# Patient Record
Sex: FEMALE | Race: WHITE
[De-identification: names, ages, dates, MRNs, and addresses within clinical notes are randomized per-mention and may not be internally consistent; named-entity substitution may affect disease eponyms.]

---

## 2017-11-11 ENCOUNTER — HOSPITAL ENCOUNTER (EMERGENCY)
Dept: HOSPITAL 31 - C.ER | Age: 27
Discharge: HOME | End: 2017-11-11
Payer: SELF-PAY

## 2017-11-11 VITALS
DIASTOLIC BLOOD PRESSURE: 72 MMHG | RESPIRATION RATE: 14 BRPM | SYSTOLIC BLOOD PRESSURE: 111 MMHG | TEMPERATURE: 98.9 F | OXYGEN SATURATION: 97 % | HEART RATE: 89 BPM

## 2017-11-11 VITALS — BODY MASS INDEX: 42.2 KG/M2

## 2017-11-11 DIAGNOSIS — H60.93: Primary | ICD-10-CM

## 2017-11-11 NOTE — C.PDOC
Time Seen by Provider: 11/11/17 19:28


Chief Complaint (Nursing): ENT Problem





Past Medical History


Vital Signs: 





 Last Vital Signs











Temp  98.9 F   11/11/17 19:25


 


Pulse  89   11/11/17 19:25


 


Resp  14   11/11/17 19:25


 


BP  111/72   11/11/17 19:25


 


Pulse Ox  97   11/11/17 19:25














- [facility] Procedures











REPAIR OB LACERATION NEC (11/22/14)








Family History: States: Unknown Family Hx





- Social History


Hx Tobacco Use: No


Hx Alcohol Use: No


Hx Substance Use: No





- Immunization History


Hx Tetanus Toxoid Vaccination: No


Hx Influenza Vaccination: No


Hx Pneumococcal Vaccination: No





ED Course And Treatment


O2 Sat by Pulse Oximetry: 97





Disposition


Counseled Patient/Family Regarding: Diagnosis, Need For Followup, Rx Given





- Disposition


Disposition: HOME/ ROUTINE


Disposition Time: 19:49


Condition: STABLE


Additional Instructions: 


Please follow up with PMD 





Take meds as directed 





Return to ER if worse 


Prescriptions: 


Ibuprofen [Motrin Tab] 800 mg PO QID #20 tab


Neomycin/Polymyxin/Hydrocortis [Cortisporin Otic Susp] 4 drop AU TID #1 bottle


Instructions:  Otitis Externa (ED)


Forms:  [employer] (Czech)


Print Language: Kyrgyz





- Clinical Impression


Clinical Impression: 


 Otitis externa

## 2017-11-11 NOTE — C.PDOC
History Of Present Illness





26 y/o female c/o bilateral ear pain for 3 days. Patient denies fever, chills, 

nausea, vomiting, headache, dizziness, or URI symptoms.





Time Seen by Provider: 11/11/17 19:28


Chief Complaint (Nursing): ENT Problem


History Per: Patient


History/Exam Limitations: None


Onset/Duration Of Symptoms: Days (3)


Current Symptoms Are (Timing): Still Present


Quality (Ear): Pain W/Touch


Severity: Mild





Past Medical History


Reviewed: Historical Data, Nursing Documentation, Vital Signs


Vital Signs: 


 Last Vital Signs











Temp  98.9 F   11/11/17 19:25


 


Pulse  89   11/11/17 19:25


 


Resp  14   11/11/17 19:25


 


BP  111/72   11/11/17 19:25


 


Pulse Ox  97   11/12/17 03:36














- [facility] Procedures








REPAIR OB LACERATION NEC (11/22/14)








Family History: States: Unknown Family Hx





- Social History


Hx Tobacco Use: No


Hx Alcohol Use: No


Hx Substance Use: No





- Immunization History


Hx Tetanus Toxoid Vaccination: No


Hx Influenza Vaccination: No


Hx Pneumococcal Vaccination: No





Review Of Systems


Except As Marked, All Systems Reviewed And Found Negative.


Constitutional: Negative for: Fever


ENT: Positive for: Ear Pain.  Negative for: Nose Discharge, Nose Congestion, 

Throat Pain


Respiratory: Negative for: Cough


Gastrointestinal: Negative for: Nausea, Vomiting





Physical Exam





- Physical Exam


Appears: Non-toxic, In Acute Distress (Crying due to ear pain)


Skin: Warm, Dry


Head: Atraumatic, Normacephalic


Ear(s): Left: Other (Left tragal tenderness and erythematous canal. Purulent 

effusion tothe left ear canal with swelling. Unable to visualize TM due to 

effusion. Right tragal tenderness, Minimal erythema, no effusion.), Right: Other


Throat: Normal, No Erythema


Neck: Normal, No Other (swelling)


Respiratory: Normal Breath Sounds


Neurological/Psych: Oriented x3





ED Course And Treatment


O2 Sat by Pulse Oximetry: 97


Pulse Ox Interpretation: Normal


Progress Note: Plans: Cortisporin, Tylenol with codeine.  Pt has much improved 

after pain meds, cortisporin otic soln applied and directions given to continue 

drops and follw up instructions and return precautions discussed and understood 

by pt





Disposition


Counseled Patient/Family Regarding: Studies Performed, Diagnosis, Need For 

Followup, Rx Given





- Disposition


Disposition: HOME/ ROUTINE


Disposition Time: 19:49


Condition: STABLE


Additional Instructions: 


Please follow up with PMD 





Take meds as directed 





Return to ER if worse 


Prescriptions: 


Ibuprofen [Motrin Tab] 800 mg PO QID #20 tab


Neomycin/Polymyxin/Hydrocortis [Cortisporin Otic Susp] 4 drop AU TID #1 bottle


Instructions:  Otitis Externa (ED)


Forms:  [employer] (Indonesian)


Print Language: Mongolian





- Clinical Impression


Clinical Impression: 


 Otitis externa








- Scribe Statement


The provider has reviewed the documentation as recorded by the Scribjovani leonardo





All medical record entries made by the Giselaibjovani were at my direction and 

personally dictated by me. I have reviewed the chart and agree that the record 

accurately reflects my personal performance of the history, physical exam, 

medical decision making, and the department course for this patient. I have 

also personally directed, reviewed, and agree with the discharge instructions 

and disposition.

## 2018-07-02 ENCOUNTER — HOSPITAL ENCOUNTER (EMERGENCY)
Dept: HOSPITAL 31 - C.ER | Age: 28
Discharge: HOME | End: 2018-07-02
Payer: COMMERCIAL

## 2018-07-02 VITALS
TEMPERATURE: 99 F | HEART RATE: 100 BPM | DIASTOLIC BLOOD PRESSURE: 76 MMHG | RESPIRATION RATE: 20 BRPM | OXYGEN SATURATION: 98 % | SYSTOLIC BLOOD PRESSURE: 122 MMHG

## 2018-07-02 VITALS — BODY MASS INDEX: 42.2 KG/M2

## 2018-07-02 DIAGNOSIS — M54.5: Primary | ICD-10-CM

## 2018-07-02 DIAGNOSIS — H60.90: ICD-10-CM

## 2018-07-02 LAB
BACTERIA #/AREA URNS HPF: (no result) /[HPF]
BILIRUB UR-MCNC: NEGATIVE MG/DL
GLUCOSE UR STRIP-MCNC: NORMAL MG/DL
HCG,QUALITATIVE URINE: NEGATIVE
LEUKOCYTE ESTERASE UR-ACNC: (no result) LEU/UL
PH UR STRIP: 7 [PH] (ref 5–8)
PROT UR STRIP-MCNC: NEGATIVE MG/DL
RBC # UR STRIP: NEGATIVE /UL
SP GR UR STRIP: 1.02 (ref 1–1.03)
SQUAMOUS EPITHIAL: 2 /HPF (ref 0–5)
UROBILINOGEN UR-MCNC: 1 MG/DL (ref 0.2–1)

## 2018-07-02 NOTE — C.PDOC
History Of Present Illness


28 y/o female presents to the ED complaining of right-sided low back pain for 

the past 2-3 days. Denies any injury or fall. Also complaining of burning and 

itching in both ears for the past week. States this started after swimming in a 

pool. Otherwise denies any ear discharge, ear injury, headache, fever, abd pain

, dysuria, incontinence, or urinary frequency. 





Time Seen by Provider: 07/02/18 16:42


Chief Complaint (Nursing): Back Pain


History Per: Patient


History/Exam Limitations: no limitations


Onset/Duration Of Symptoms: Days


Current Symptoms Are (Timing): Still Present





Past Medical History


Reviewed: Historical Data, Nursing Documentation, Vital Signs


Vital Signs: 


 Last Vital Signs











Temp  99 F   07/02/18 16:56


 


Pulse  100 H  07/02/18 16:56


 


Resp  20   07/02/18 16:56


 


BP  122/76   07/02/18 16:56


 


Pulse Ox  98   07/02/18 18:49











Surgical History: No Surg Hx





- [facility] Procedures








REPAIR OB LACERATION NEC (11/22/14)








Family History: States: Unknown Family Hx





- Social History


Hx Tobacco Use: No


Hx Alcohol Use: No


Hx Substance Use: No





- Immunization History


Hx Tetanus Toxoid Vaccination: No


Hx Influenza Vaccination: No


Hx Pneumococcal Vaccination: No





Review Of Systems


Except As Marked, All Systems Reviewed And Found Negative.


Constitutional: Negative for: Fever, Chills


ENT: Positive for: Ear Pain (burning and itching to B/L ears).  Negative for: 

Ear Discharge


Gastrointestinal: Negative for: Abdominal Pain


Genitourinary: Negative for: Dysuria, Frequency, Incontinence


Musculoskeletal: Positive for: Back Pain


Neurological: Negative for: Headache





Physical Exam





- Physical Exam


Appears: Non-toxic, No Acute Distress, Other (Morbidly obese)


Skin: Normal Color, Warm, Dry


Head: Atraumatic, Normacephalic


Eye(s): bilateral: Normal Inspection


Ear(s): Bilateral: Other (Both canals appear edematous and slightly scaly, TMs 

visualized and appear normal; No mastoid tenderness)


Oral Mucosa: Moist


Neck: Normal ROM, No Midline Cervical Tenderness, No Paracervical Tenderness, 

Supple


Chest: Symmetrical


Respiratory: No Accessory Muscle Use


Back: No Vertebral Tenderness, Paraspinal Tenderness (lateral muscles are 

slightly tender)


Extremity: Bilateral: Atraumatic, Normal ROM


Pulses: Left Radial: Normal, Right Radial: Normal


Neurological/Psych: Oriented x3, Normal Speech, Normal Cranial Nerves, Normal 

Motor, Normal Sensation





ED Course And Treatment





- Laboratory Results


Lab Interpretation: Normal (Urine is negative)


O2 Sat by Pulse Oximetry: 98 (RA)


Pulse Ox Interpretation: Normal


Progress Note: UA and urine preg sent. Urine is negative. Patient treated with 

Cortisporin ear drops. Patient is medically stable, will discharge home with 

Motrin and ear drop prescriptions.





Disposition


Counseled Patient/Family Regarding: Studies Performed, Diagnosis, Need For 

Followup, Rx Given





- Disposition


Referrals: 


[facility] at [facility] [Outside]


Disposition: HOME/ ROUTINE


Disposition Time: 17:31


Condition: GOOD


Additional Instructions: 


Follow up in Clinic within 2-3 days. Return to ED if feel worse.


Prescriptions: 


Neomycin/Polymyxin/Hydrocortis [Cortisporin Otic Susp] 3 drop TOP TID #1 bottle


Ibuprofen [Motrin Tab] 600 mg PO Q8 #30 tab


Instructions:  Outer Ear Infection (DC)


Forms:  [employer] (English)


Print Language: Persian





- POA


Present On Arrival: None





- Clinical Impression


Clinical Impression: 


 Otitis externa, Low back pain








- PA / NP / Resident Statement


MD/DO has reviewed & agrees with the documentation as recorded.





- Scribe Statement


The provider has reviewed the documentation as recorded by the Scribe (Nitza Francisco)





All medical record entries made by the Scribe were at my direction and 

personally dictated by me. I have reviewed the chart and agree that the record 

accurately reflects my personal performance of the history, physical exam, 

medical decision making, and the department course for this patient. I have 

also personally directed, reviewed, and agree with the discharge instructions 

and disposition.